# Patient Record
Sex: FEMALE | Race: WHITE | NOT HISPANIC OR LATINO
[De-identification: names, ages, dates, MRNs, and addresses within clinical notes are randomized per-mention and may not be internally consistent; named-entity substitution may affect disease eponyms.]

---

## 2019-04-04 PROBLEM — Z00.00 ENCOUNTER FOR PREVENTIVE HEALTH EXAMINATION: Status: ACTIVE | Noted: 2019-04-04

## 2019-04-29 ENCOUNTER — APPOINTMENT (OUTPATIENT)
Dept: HEART AND VASCULAR | Facility: CLINIC | Age: 69
End: 2019-04-29
Payer: COMMERCIAL

## 2019-04-29 VITALS
RESPIRATION RATE: 14 BRPM | DIASTOLIC BLOOD PRESSURE: 76 MMHG | TEMPERATURE: 98.2 F | BODY MASS INDEX: 22.35 KG/M2 | WEIGHT: 123 LBS | HEIGHT: 62.25 IN | OXYGEN SATURATION: 98 % | SYSTOLIC BLOOD PRESSURE: 108 MMHG | HEART RATE: 73 BPM

## 2019-04-29 DIAGNOSIS — Z87.891 PERSONAL HISTORY OF NICOTINE DEPENDENCE: ICD-10-CM

## 2019-04-29 DIAGNOSIS — Z83.438 FAMILY HISTORY OF OTHER DISORDER OF LIPOPROTEIN METABOLISM AND OTHER LIPIDEMIA: ICD-10-CM

## 2019-04-29 DIAGNOSIS — Z82.49 FAMILY HISTORY OF ISCHEMIC HEART DISEASE AND OTHER DISEASES OF THE CIRCULATORY SYSTEM: ICD-10-CM

## 2019-04-29 DIAGNOSIS — R01.1 CARDIAC MURMUR, UNSPECIFIED: ICD-10-CM

## 2019-04-29 DIAGNOSIS — Z78.9 OTHER SPECIFIED HEALTH STATUS: ICD-10-CM

## 2019-04-29 PROCEDURE — 99244 OFF/OP CNSLTJ NEW/EST MOD 40: CPT

## 2019-04-29 PROCEDURE — 93000 ELECTROCARDIOGRAM COMPLETE: CPT

## 2019-04-29 RX ORDER — MONTELUKAST 10 MG/1
10 TABLET, FILM COATED ORAL
Qty: 30 | Refills: 0 | Status: DISCONTINUED | COMMUNITY
Start: 2018-11-08 | End: 2019-04-29

## 2019-04-29 NOTE — REVIEW OF SYSTEMS
[Shortness Of Breath] : no shortness of breath [Chest Pain] : no chest pain [Palpitations] : palpitations [Negative] : Gastrointestinal

## 2019-04-29 NOTE — PHYSICAL EXAM
[General Appearance - Well Developed] : well developed [Normal Appearance] : normal appearance [Well Groomed] : well groomed [General Appearance - Well Nourished] : well nourished [No Deformities] : no deformities [General Appearance - In No Acute Distress] : no acute distress [Normal Conjunctiva] : the conjunctiva exhibited no abnormalities [] : no respiratory distress [Respiration, Rhythm And Depth] : normal respiratory rhythm and effort [Exaggerated Use Of Accessory Muscles For Inspiration] : no accessory muscle use [Auscultation Breath Sounds / Voice Sounds] : lungs were clear to auscultation bilaterally [Heart Rate And Rhythm] : heart rate and rhythm were normal [Heart Sounds] : normal S1 and S2 [Bowel Sounds] : normal bowel sounds [Abdomen Soft] : soft [Abnormal Walk] : normal gait [FreeTextEntry1] : no edema [Skin Turgor] : normal skin turgor [Oriented To Time, Place, And Person] : oriented to person, place, and time [Affect] : the affect was normal [Mood] : the mood was normal [No Anxiety] : not feeling anxious

## 2019-04-29 NOTE — DISCUSSION/SUMMARY
[FreeTextEntry1] : Palpitations, murmur, MVP--I asked her to return for an Echocardiogram. Obtain stress test results

## 2019-04-29 NOTE — HISTORY OF PRESENT ILLNESS
[FreeTextEntry1] : 68 year female with MVP and extra heart beats. She was seen by Dr Louie who performed an Echo on May 22, 2018. She believes she had Stress Test in Fall 2018

## 2019-04-30 ENCOUNTER — TRANSCRIPTION ENCOUNTER (OUTPATIENT)
Age: 69
End: 2019-04-30

## 2019-04-30 LAB
ALBUMIN SERPL ELPH-MCNC: 4.7 G/DL
ALP BLD-CCNC: 53 U/L
ALT SERPL-CCNC: 25 U/L
ANION GAP SERPL CALC-SCNC: 12 MMOL/L
AST SERPL-CCNC: 26 U/L
BASOPHILS # BLD AUTO: 0.04 K/UL
BASOPHILS NFR BLD AUTO: 0.6 %
BILIRUB SERPL-MCNC: 0.4 MG/DL
BUN SERPL-MCNC: 18 MG/DL
CALCIUM SERPL-MCNC: 9.9 MG/DL
CHLORIDE SERPL-SCNC: 100 MMOL/L
CHOLEST SERPL-MCNC: 234 MG/DL
CHOLEST/HDLC SERPL: 4 RATIO
CO2 SERPL-SCNC: 27 MMOL/L
CREAT SERPL-MCNC: 0.74 MG/DL
EOSINOPHIL # BLD AUTO: 0.17 K/UL
EOSINOPHIL NFR BLD AUTO: 2.6 %
GLUCOSE SERPL-MCNC: 93 MG/DL
HCT VFR BLD CALC: 37.3 %
HDLC SERPL-MCNC: 59 MG/DL
HGB BLD-MCNC: 11.8 G/DL
IMM GRANULOCYTES NFR BLD AUTO: 0.2 %
LDLC SERPL CALC-MCNC: 153 MG/DL
LYMPHOCYTES # BLD AUTO: 2.83 K/UL
LYMPHOCYTES NFR BLD AUTO: 42.5 %
MAGNESIUM SERPL-MCNC: 2.1 MG/DL
MAN DIFF?: NORMAL
MCHC RBC-ENTMCNC: 30.8 PG
MCHC RBC-ENTMCNC: 31.6 GM/DL
MCV RBC AUTO: 97.4 FL
MONOCYTES # BLD AUTO: 0.48 K/UL
MONOCYTES NFR BLD AUTO: 7.2 %
NEUTROPHILS # BLD AUTO: 3.13 K/UL
NEUTROPHILS NFR BLD AUTO: 46.9 %
PLATELET # BLD AUTO: 251 K/UL
POTASSIUM SERPL-SCNC: 4.3 MMOL/L
PROT SERPL-MCNC: 7.6 G/DL
RBC # BLD: 3.83 M/UL
RBC # FLD: 13 %
SODIUM SERPL-SCNC: 139 MMOL/L
TRIGL SERPL-MCNC: 111 MG/DL
TSH SERPL-ACNC: 2.18 UIU/ML
WBC # FLD AUTO: 6.66 K/UL

## 2019-05-06 ENCOUNTER — CLINICAL ADVICE (OUTPATIENT)
Age: 69
End: 2019-05-06

## 2019-05-20 ENCOUNTER — APPOINTMENT (OUTPATIENT)
Dept: HEART AND VASCULAR | Facility: CLINIC | Age: 69
End: 2019-05-20
Payer: COMMERCIAL

## 2019-05-20 DIAGNOSIS — I34.0 NONRHEUMATIC MITRAL (VALVE) INSUFFICIENCY: ICD-10-CM

## 2019-05-20 DIAGNOSIS — Z86.79 PERSONAL HISTORY OF OTHER DISEASES OF THE CIRCULATORY SYSTEM: ICD-10-CM

## 2019-05-20 PROCEDURE — 99214 OFFICE O/P EST MOD 30 MIN: CPT | Mod: 25

## 2019-05-20 PROCEDURE — 93306 TTE W/DOPPLER COMPLETE: CPT

## 2019-05-20 NOTE — PHYSICAL EXAM
[General Appearance - Well Developed] : well developed [Normal Appearance] : normal appearance [Well Groomed] : well groomed [General Appearance - Well Nourished] : well nourished [No Deformities] : no deformities [General Appearance - In No Acute Distress] : no acute distress [Normal Conjunctiva] : the conjunctiva exhibited no abnormalities [Abnormal Walk] : normal gait [Oriented To Time, Place, And Person] : oriented to person, place, and time [Affect] : the affect was normal [Mood] : the mood was normal [No Anxiety] : not feeling anxious

## 2019-05-20 NOTE — DISCUSSION/SUMMARY
[FreeTextEntry1] : At the time of the patient's visit an Echocardiogram was performed to evaluate her LV function. \par \par At the time of the visit the results were reviewed with patient. I asked her to return for a stress test

## 2019-05-20 NOTE — HISTORY OF PRESENT ILLNESS
[FreeTextEntry1] : 68 year female with palpitations. We reviewed her labs. We discussed option of dietary improvement versus statin use

## 2019-05-22 ENCOUNTER — TRANSCRIPTION ENCOUNTER (OUTPATIENT)
Age: 69
End: 2019-05-22

## 2019-06-24 ENCOUNTER — APPOINTMENT (OUTPATIENT)
Dept: HEART AND VASCULAR | Facility: CLINIC | Age: 69
End: 2019-06-24

## 2019-06-26 ENCOUNTER — APPOINTMENT (OUTPATIENT)
Dept: HEART AND VASCULAR | Facility: CLINIC | Age: 69
End: 2019-06-26

## 2020-10-08 ENCOUNTER — OUTPATIENT (OUTPATIENT)
Dept: OUTPATIENT SERVICES | Facility: HOSPITAL | Age: 70
LOS: 1 days | End: 2020-10-08
Payer: MEDICARE

## 2020-10-08 VITALS
DIASTOLIC BLOOD PRESSURE: 80 MMHG | HEIGHT: 65 IN | RESPIRATION RATE: 18 BRPM | OXYGEN SATURATION: 99 % | HEART RATE: 82 BPM | SYSTOLIC BLOOD PRESSURE: 136 MMHG | TEMPERATURE: 99 F | WEIGHT: 119.93 LBS

## 2020-10-08 DIAGNOSIS — Z01.818 ENCOUNTER FOR OTHER PREPROCEDURAL EXAMINATION: ICD-10-CM

## 2020-10-08 DIAGNOSIS — N84.0 POLYP OF CORPUS UTERI: ICD-10-CM

## 2020-10-08 DIAGNOSIS — N95.0 POSTMENOPAUSAL BLEEDING: ICD-10-CM

## 2020-10-08 DIAGNOSIS — Z98.890 OTHER SPECIFIED POSTPROCEDURAL STATES: Chronic | ICD-10-CM

## 2020-10-08 LAB
ALBUMIN SERPL ELPH-MCNC: 3.7 G/DL — SIGNIFICANT CHANGE UP (ref 3.3–5)
ALP SERPL-CCNC: 45 U/L — SIGNIFICANT CHANGE UP (ref 40–120)
ALT FLD-CCNC: 34 U/L — SIGNIFICANT CHANGE UP (ref 12–78)
ANION GAP SERPL CALC-SCNC: 5 MMOL/L — SIGNIFICANT CHANGE UP (ref 5–17)
AST SERPL-CCNC: 25 U/L — SIGNIFICANT CHANGE UP (ref 15–37)
BILIRUB SERPL-MCNC: 0.4 MG/DL — SIGNIFICANT CHANGE UP (ref 0.2–1.2)
BUN SERPL-MCNC: 18 MG/DL — SIGNIFICANT CHANGE UP (ref 7–23)
CALCIUM SERPL-MCNC: 8.7 MG/DL — SIGNIFICANT CHANGE UP (ref 8.5–10.1)
CHLORIDE SERPL-SCNC: 108 MMOL/L — SIGNIFICANT CHANGE UP (ref 96–108)
CO2 SERPL-SCNC: 28 MMOL/L — SIGNIFICANT CHANGE UP (ref 22–31)
CREAT SERPL-MCNC: 0.71 MG/DL — SIGNIFICANT CHANGE UP (ref 0.5–1.3)
GLUCOSE SERPL-MCNC: 93 MG/DL — SIGNIFICANT CHANGE UP (ref 70–99)
HCT VFR BLD CALC: 33.1 % — LOW (ref 34.5–45)
HGB BLD-MCNC: 11.5 G/DL — SIGNIFICANT CHANGE UP (ref 11.5–15.5)
MCHC RBC-ENTMCNC: 31.4 PG — SIGNIFICANT CHANGE UP (ref 27–34)
MCHC RBC-ENTMCNC: 34.7 GM/DL — SIGNIFICANT CHANGE UP (ref 32–36)
MCV RBC AUTO: 90.4 FL — SIGNIFICANT CHANGE UP (ref 80–100)
NRBC # BLD: 0 /100 WBCS — SIGNIFICANT CHANGE UP (ref 0–0)
PLATELET # BLD AUTO: 229 K/UL — SIGNIFICANT CHANGE UP (ref 150–400)
POTASSIUM SERPL-MCNC: 4.1 MMOL/L — SIGNIFICANT CHANGE UP (ref 3.5–5.3)
POTASSIUM SERPL-SCNC: 4.1 MMOL/L — SIGNIFICANT CHANGE UP (ref 3.5–5.3)
PROT SERPL-MCNC: 7.4 G/DL — SIGNIFICANT CHANGE UP (ref 6–8.3)
RBC # BLD: 3.66 M/UL — LOW (ref 3.8–5.2)
RBC # FLD: 12.6 % — SIGNIFICANT CHANGE UP (ref 10.3–14.5)
SODIUM SERPL-SCNC: 141 MMOL/L — SIGNIFICANT CHANGE UP (ref 135–145)
WBC # BLD: 5.91 K/UL — SIGNIFICANT CHANGE UP (ref 3.8–10.5)
WBC # FLD AUTO: 5.91 K/UL — SIGNIFICANT CHANGE UP (ref 3.8–10.5)

## 2020-10-08 PROCEDURE — G0463: CPT

## 2020-10-08 PROCEDURE — 86850 RBC ANTIBODY SCREEN: CPT

## 2020-10-08 PROCEDURE — 93005 ELECTROCARDIOGRAM TRACING: CPT

## 2020-10-08 PROCEDURE — 36415 COLL VENOUS BLD VENIPUNCTURE: CPT

## 2020-10-08 PROCEDURE — 86900 BLOOD TYPING SEROLOGIC ABO: CPT

## 2020-10-08 PROCEDURE — 80053 COMPREHEN METABOLIC PANEL: CPT

## 2020-10-08 PROCEDURE — 93010 ELECTROCARDIOGRAM REPORT: CPT

## 2020-10-08 PROCEDURE — 85027 COMPLETE CBC AUTOMATED: CPT

## 2020-10-08 PROCEDURE — 86901 BLOOD TYPING SEROLOGIC RH(D): CPT

## 2020-10-08 RX ORDER — ROSUVASTATIN CALCIUM 5 MG/1
1 TABLET ORAL
Qty: 0 | Refills: 0 | DISCHARGE

## 2020-10-08 NOTE — H&P PST ADULT - NSICDXPASTMEDICALHX_GEN_ALL_CORE_FT
PAST MEDICAL HISTORY:  HLD (hyperlipidemia)     MVP (mitral valve prolapse)     Pulmonary nodule      PAST MEDICAL HISTORY:  HLD (hyperlipidemia)     MVP (mitral valve prolapse)     Polyp of corpus uteri     Pulmonary nodule

## 2020-10-08 NOTE — H&P PST ADULT - RS GEN PE MLT RESP DETAILS PC
no rales/breath sounds equal/no rhonchi/chest wall tenderness/respirations non-labored/airway patent/good air movement/clear to auscultation bilaterally

## 2020-10-08 NOTE — H&P PST ADULT - ALLERGY TYPES
outdoor environmental allergies/indoor environmental allergies/reactions to animals/dog dander, hay fever

## 2020-10-08 NOTE — H&P PST ADULT - NSICDXPROBLEM_GEN_ALL_CORE_FT
PROBLEM DIAGNOSES  Problem: Polyp of corpus uteri  Assessment and Plan: She is scheduled for Dilation and curettage hysteroscopy with myosure device by Dr. Vidal on 10/22/20.    Problem: Preop testing  Assessment and Plan: *COVID testing will be done at [facility] and scheduled on 10/19/20  * Following labs ordered-CBC, CMP,Type and screen  * Following diagnostics ordered- EKG.  * Going for medical clearance with Dr. Mariscal who is her cardiologist. Patient scheduled self on 10/13/20.       * Stop any herbal remedies, vitamin supplements or any medication that contains Aspirin , Ibuprofen, Advil, Motrin or Aleve at least 7 days before surgery.  * No illegal drugs for 7 days and no alcohol 24 hrs before procedure.  * Preop instructions explained. Verbalized understanding.

## 2020-10-08 NOTE — H&P PST ADULT - ASSESSMENT
This is a 70 year old alert and oriented white  female with past medical history of high cholesterol, MVP,  here today for pre surgical testing.  She is scheduled for Dilation and curettage hysteroscopy with myosure device by Dr. Vidal on 10/22/20.

## 2020-10-08 NOTE — H&P PST ADULT - HISTORY OF PRESENT ILLNESS
This is a 70 year old alert and oriented white  female with past medical history of high cholesterol, MVP,  here today for pre surgical testing.  She is scheduled for Dilation and curettage hysteroscopy with myosure device by Dr. Vidal on 10/22/20.  Reports having spotting x 2 after recent Ob/gyn visit  was seen again by Ob/gyn, had Ultrasound   Denies any   This is a 70 year old alert and oriented white  female with past medical history of high cholesterol, MVP,  here today for pre surgical testing.  She is scheduled for Dilation and curettage hysteroscopy with myosure device by Dr. Vidal on 10/22/20.  Reports having spotting x 2 after recent Ob/gyn visit  was seen again by Ob/gyn, had Ultrasound of Pelvis and noted to have polyp and advised for the above procedure,   Denies any bleeding or abdominal pain.

## 2020-10-18 DIAGNOSIS — Z01.818 ENCOUNTER FOR OTHER PREPROCEDURAL EXAMINATION: ICD-10-CM

## 2020-10-19 ENCOUNTER — APPOINTMENT (OUTPATIENT)
Dept: DISASTER EMERGENCY | Facility: CLINIC | Age: 70
End: 2020-10-19

## 2021-04-13 ENCOUNTER — APPOINTMENT (OUTPATIENT)
Dept: GASTROENTEROLOGY | Facility: CLINIC | Age: 71
End: 2021-04-13

## 2022-06-06 PROBLEM — N84.0 POLYP OF CORPUS UTERI: Chronic | Status: ACTIVE | Noted: 2020-10-08

## 2022-06-06 PROBLEM — I34.1 NONRHEUMATIC MITRAL (VALVE) PROLAPSE: Chronic | Status: ACTIVE | Noted: 2020-10-08

## 2022-06-06 PROBLEM — R91.1 SOLITARY PULMONARY NODULE: Chronic | Status: ACTIVE | Noted: 2020-10-08

## 2022-06-06 PROBLEM — E78.5 HYPERLIPIDEMIA, UNSPECIFIED: Chronic | Status: ACTIVE | Noted: 2020-10-08

## 2022-09-01 ENCOUNTER — APPOINTMENT (OUTPATIENT)
Dept: ORTHOPEDIC SURGERY | Facility: CLINIC | Age: 72
End: 2022-09-01

## 2022-09-01 VITALS — WEIGHT: 112 LBS | HEIGHT: 64 IN | BODY MASS INDEX: 19.12 KG/M2

## 2022-09-01 DIAGNOSIS — C80.1 MALIGNANT (PRIMARY) NEOPLASM, UNSPECIFIED: ICD-10-CM

## 2022-09-01 PROCEDURE — 73564 X-RAY EXAM KNEE 4 OR MORE: CPT | Mod: 50

## 2022-09-01 PROCEDURE — 20610 DRAIN/INJ JOINT/BURSA W/O US: CPT | Mod: RT

## 2022-09-01 PROCEDURE — J3490M: CUSTOM

## 2022-09-01 PROCEDURE — 99214 OFFICE O/P EST MOD 30 MIN: CPT | Mod: 25

## 2022-09-01 NOTE — IMAGING
[Bilateral] : knee bilaterally [All Views] : anteroposterior, lateral, skyline, and anteroposterior standing [There are no fractures, subluxations or dislocations. No significant abnormalities are seen] : There are no fractures, subluxations or dislocations. No significant abnormalities are seen

## 2022-09-01 NOTE — PHYSICAL EXAM
[Bilateral] : knee bilaterally [NL (140)] : flexion 140 degrees [NL (0)] : extension 0 degrees [] : no erythema

## 2022-09-01 NOTE — PROCEDURE
[Effusion] : effusion [de-identified] : Procedure Name: Large Joint Injection / Aspiration: Depomedrol and Marcaine\par Anesthesia: ethyl chloride sprayed topically.. \par Depomedrol: An injection of Depomedrol 80 mg , 1 cc. \par Marcaine: 5 cc. \par Medication was injected in the right knee. Patient has tried OTC's including aspirin, Ibuprofen, Aleve etc or prescription\par NSAIDS, and/or exercises at home and/ or physical therapy without satisfactory response. After verbal consent using sterile preparation and technique. The risks, benefits, and alternatives to cortisone injection were explained in full to the patient. Risks outlined include but are not limited to infection, sepsis, bleeding, scarring, skin discoloration, temporary  increase in pain, syncopal episode, failure to resolve symptoms, allergic reaction, symptom recurrence, and elevation of blood sugar in diabetics. Patient understood the risks. All questions were answered. After discussion of options, patient requested an injection. Oral informed consent was obtained and sterile prep was done of the injection\par site. Sterile technique was utilized for the procedure including the preparation of the solutions used for the injection. Patient tolerated the procedure well. Advised to ice the injection site this evening. Prep with alcohol locally to site. Sterile technique used. Post Procedure Instructions: Patient was advised to call if redness, pain, or fever occur and\par apply ice for 15 min. out of every hour for the next 12-24 hours as tolerated.  [de-identified] : 22cc

## 2022-09-01 NOTE — ASSESSMENT
[FreeTextEntry1] : 51 year F WITH MODERATE B/L KNEE PAIN WITHOUT INJURY. C/O TIGHTNESS AND PAIN TO THE POSTERIOR ASPECT OF THE KNEES. HAD LT KNEE CSI IN JUNE 2022 WITH SOME RELIEF. PAIN WORSENS WITH STAIRS AND WALKING PROLONGED DISTANCES. PAIN IS AFFECTING ADL AND FUNCTIONAL ACTIVITIES. XRAYS REVIEWED AS NORMAL. TREATMENT OPTIONS REVIEWED. RT KNEE ASP/INJ TODAY. PATIENT TOLERATED INJECTION WELL.

## 2022-09-01 NOTE — HISTORY OF PRESENT ILLNESS
[Left Leg] : left leg [Right Leg] : right leg [Gradual] : gradual [8] : 8 [6] : 6 [Localized] : localized [Tightness] : tightness [Intermittent] : intermittent [Household chores] : household chores [Injection therapy] : injection therapy [Standing] : standing [Walking] : walking [Bending forward] : bending forward [Stairs] : stairs [de-identified] : pt presents here today with bilateral knees pain\par tightness and pain behind the knees \par no injury \par pt had a cortisone injection on the left knee about 2 months ago with some relief  [] : no [FreeTextEntry1] : knees [FreeTextEntry5] : no injury [de-identified] : cortisone injections

## 2022-09-08 ENCOUNTER — APPOINTMENT (OUTPATIENT)
Dept: ORTHOPEDIC SURGERY | Facility: CLINIC | Age: 72
End: 2022-09-08

## 2022-09-08 VITALS — WEIGHT: 112 LBS | HEIGHT: 64 IN | BODY MASS INDEX: 19.12 KG/M2

## 2022-09-08 PROCEDURE — 99214 OFFICE O/P EST MOD 30 MIN: CPT

## 2022-09-08 NOTE — ASSESSMENT
[FreeTextEntry1] : 51 year F WITH MODERATE B/L KNEE PAIN WITHOUT INJURY. C/O TIGHTNESS AND PAIN TO THE POSTERIOR ASPECT OF THE KNEES. HAD LT KNEE CSI IN JUNE 2022 WITH SOME RELIEF. PAIN WORSENS WITH STAIRS AND WALKING PROLONGED DISTANCES. PAIN IS AFFECTING ADL AND FUNCTIONAL ACTIVITIES. XRAYS REVIEWED AS NORMAL. TREATMENT OPTIONS REVIEWED. \par HAD RIGHT ON 9/1/22 WITH GOOD RELIEF. \par \par LEFT KNEE TODAY.

## 2022-09-08 NOTE — PROCEDURE
[de-identified] : Procedure Name: Large Joint Injection / Aspiration: Depomedrol and Marcaine\par Anesthesia: ethyl chloride sprayed topically.. \par Depomedrol: An injection of Depomedrol 80 mg , 1 cc. \par Marcaine: 5 cc. \par Medication was injected in the left knee. Patient has tried OTC's including aspirin, Ibuprofen, Aleve etc or prescription\par NSAIDS, and/or exercises at home and/ or physical therapy without satisfactory response. After verbal consent using sterile preparation and technique. The risks, benefits, and alternatives to cortisone injection were explained in full to the patient. Risks outlined include but are not limited to infection, sepsis, bleeding, scarring, skin discoloration, temporary  increase in pain, syncopal episode, failure to resolve symptoms, allergic reaction, symptom recurrence, and elevation of blood sugar in diabetics. Patient understood the risks. All questions were answered. After discussion of options, patient requested an injection. Oral informed consent was obtained and sterile prep was done of the injection\par site. Sterile technique was utilized for the procedure including the preparation of the solutions used for the injection. Patient tolerated the procedure well. Advised to ice the injection site this evening. Prep with alcohol locally to site. Sterile technique used. Post Procedure Instructions: Patient was advised to call if redness, pain, or fever occur and\par apply ice for 15 min. out of every hour for the next 12-24 hours as tolerated.

## 2022-09-13 ENCOUNTER — APPOINTMENT (OUTPATIENT)
Dept: ENDOCRINOLOGY | Facility: CLINIC | Age: 72
End: 2022-09-13

## 2022-11-10 ENCOUNTER — APPOINTMENT (OUTPATIENT)
Dept: ENDOCRINOLOGY | Facility: CLINIC | Age: 72
End: 2022-11-10

## 2022-11-22 ENCOUNTER — APPOINTMENT (OUTPATIENT)
Dept: ENDOCRINOLOGY | Facility: CLINIC | Age: 72
End: 2022-11-22

## 2022-11-22 NOTE — HISTORY OF PRESENT ILLNESS
[FreeTextEntry1] : Ms. SOARES  is a 72 year old  female  who presents for initial endocrine evaluation. She presents with regard to a history of \par \par  Additional medical history includes that of Uterine Carcinoma, hypertension. hyperlipidemia\par \par \par Medications:\par  Lexapro, Furosemide 20 mg, HCTZ 25 mg, Rosuvastatin 5 mg, Spironolactone 25 mg\par

## 2023-01-12 ENCOUNTER — APPOINTMENT (OUTPATIENT)
Dept: ORTHOPEDIC SURGERY | Facility: CLINIC | Age: 73
End: 2023-01-12
Payer: MEDICARE

## 2023-01-12 PROCEDURE — 20610 DRAIN/INJ JOINT/BURSA W/O US: CPT | Mod: LT

## 2023-01-12 PROCEDURE — J3490N: CUSTOM | Mod: NC

## 2023-01-12 PROCEDURE — 99214 OFFICE O/P EST MOD 30 MIN: CPT | Mod: 25

## 2023-01-12 NOTE — PROCEDURE
[de-identified] : Procedure Name: Large Joint Injection / Aspiration: Depomedrol and Marcaine\par Anesthesia: ethyl chloride sprayed topically.. \par Depomedrol: An injection of Depomedrol 80 mg , 1 cc. \par Marcaine: 5 cc. \par Medication was injected in the left knee. Patient has tried OTC's including aspirin, Ibuprofen, Aleve etc or prescription\par NSAIDS, and/or exercises at home and/ or physical therapy without satisfactory response. After verbal consent using sterile preparation and technique. The risks, benefits, and alternatives to cortisone injection were explained in full to the patient. Risks outlined include but are not limited to infection, sepsis, bleeding, scarring, skin discoloration, temporary  increase in pain, syncopal episode, failure to resolve symptoms, allergic reaction, symptom recurrence, and elevation of blood sugar in diabetics. Patient understood the risks. All questions were answered. After discussion of options, patient requested an injection. Oral informed consent was obtained and sterile prep was done of the injection\par site. Sterile technique was utilized for the procedure including the preparation of the solutions used for the injection. Patient tolerated the procedure well. Advised to ice the injection site this evening. Prep with alcohol locally to site. Sterile technique used. Post Procedure Instructions: Patient was advised to call if redness, pain, or fever occur and\par apply ice for 15 min. out of every hour for the next 12-24 hours as tolerated.

## 2023-01-12 NOTE — ASSESSMENT
[FreeTextEntry1] : 51 year F WITH MODERATE B/L KNEE PAIN WITHOUT INJURY. C/O TIGHTNESS AND PAIN TO THE POSTERIOR ASPECT OF THE KNEES. HAD LT KNEE CSI IN JUNE 2022 WITH SOME RELIEF. PAIN WORSENS WITH STAIRS AND WALKING PROLONGED DISTANCES. PAIN IS AFFECTING ADL AND FUNCTIONAL ACTIVITIES. XRAYS REVIEWED AS NORMAL. TREATMENT OPTIONS REVIEWED. \par HAD RIGHT ON 9/1/22 WITH GOOD RELIEF. \par \par LEFT KNEE TODAY. ASP 5CC PRIOR TO INJECTION

## 2023-01-12 NOTE — HISTORY OF PRESENT ILLNESS
[7] : 7 [Constant] : constant [Rest] : rest [Injection therapy] : injection therapy [Walking] : walking [Stairs] : stairs [de-identified] : 01/12/23 HERE FOR A FOLLOW UP ON THE LEFT KNEE ,HAD CSI LAST VISIT IN SEPTEMBER WITH GOOD RELIEF  [FreeTextEntry1] : left leg [de-identified] : LEFT CSI INJECTION

## 2023-04-14 ENCOUNTER — APPOINTMENT (OUTPATIENT)
Dept: ORTHOPEDIC SURGERY | Facility: CLINIC | Age: 73
End: 2023-04-14

## 2023-04-28 ENCOUNTER — LABORATORY RESULT (OUTPATIENT)
Age: 73
End: 2023-04-28

## 2023-04-28 ENCOUNTER — APPOINTMENT (OUTPATIENT)
Dept: ORTHOPEDIC SURGERY | Facility: CLINIC | Age: 73
End: 2023-04-28
Payer: MEDICARE

## 2023-04-28 VITALS — HEIGHT: 63.5 IN | BODY MASS INDEX: 19.78 KG/M2 | WEIGHT: 113 LBS

## 2023-04-28 DIAGNOSIS — M17.11 UNILATERAL PRIMARY OSTEOARTHRITIS, RIGHT KNEE: ICD-10-CM

## 2023-04-28 DIAGNOSIS — M17.12 UNILATERAL PRIMARY OSTEOARTHRITIS, LEFT KNEE: ICD-10-CM

## 2023-04-28 PROCEDURE — 20610 DRAIN/INJ JOINT/BURSA W/O US: CPT | Mod: 50

## 2023-04-28 PROCEDURE — J3490M: CUSTOM | Mod: NC

## 2023-04-28 PROCEDURE — 99214 OFFICE O/P EST MOD 30 MIN: CPT | Mod: 25

## 2023-04-28 NOTE — PHYSICAL EXAM
[Bilateral] : knee bilaterally [NL (140)] : flexion 140 degrees [NL (0)] : extension 0 degrees [] : patient ambulates without assistive device

## 2023-04-28 NOTE — PROCEDURE
[Right] : of the right [Knee] : knee [Effusion] : effusion [de-identified] : Procedure Name: Large Joint Injection / Aspiration: Depomedrol and Marcaine\par Anesthesia: ethyl chloride sprayed topically.. \par Depomedrol: An injection of Depomedrol 40 mg , 1 cc. \par Marcaine: 5 cc. \par Medication was injected in the right knee. Patient has tried OTC's including aspirin, Ibuprofen, Aleve etc or prescription\par NSAIDS, and/or exercises at home and/ or physical therapy without satisfactory response. After verbal consent using sterile preparation and technique. The risks, benefits, and alternatives to cortisone injection were explained in full to the patient. Risks outlined include but are not limited to infection, sepsis, bleeding, scarring, skin discoloration, temporary  increase in pain, syncopal episode, failure to resolve symptoms, allergic reaction, symptom recurrence, and elevation of blood sugar in diabetics. Patient understood the risks. All questions were answered. After discussion of options, patient requested an injection. Oral informed consent was obtained and sterile prep was done of the injection\par site. Sterile technique was utilized for the procedure including the preparation of the solutions used for the injection. Patient tolerated the procedure well. Advised to ice the injection site this evening. Prep with alcohol locally to site. Sterile technique used. Post Procedure Instructions: Patient was advised to call if redness, pain, or fever occur and\par apply ice for 15 min. out of every hour for the next 12-24 hours as tolerated.  [de-identified] : 40cc

## 2023-04-28 NOTE — ASSESSMENT
[FreeTextEntry1] : 72 year F WITH MODERATE B/L KNEE PAIN WITHOUT INJURY. C/O TIGHTNESS AND PAIN TO THE POSTERIOR ASPECT OF THE KNEES. HAD LT KNEE CSI IN JUNE 2022 WITH SOME RELIEF. PAIN WORSENS WITH STAIRS AND WALKING PROLONGED DISTANCES. PAIN IS AFFECTING ADL AND FUNCTIONAL ACTIVITIES. XRAYS REVIEWED AS NORMAL. TREATMENT OPTIONS REVIEWED. HAD LT KNEE CSI ON 1/12/23 WITH GOOD RELIEF. QUESTIONS ANSWERED. \par \par RT KNEE CSI TODAY WITH 40MG DEPO. PATIENT TOLERATED INJECTION WELL. \par 40cc ASPIRATED PRIOR TO INJECTION. \par WILL SEND FOR LABS.

## 2023-05-18 ENCOUNTER — APPOINTMENT (OUTPATIENT)
Dept: ORTHOPEDIC SURGERY | Facility: CLINIC | Age: 73
End: 2023-05-18

## 2023-05-26 ENCOUNTER — APPOINTMENT (OUTPATIENT)
Dept: ORTHOPEDIC SURGERY | Facility: CLINIC | Age: 73
End: 2023-05-26

## 2023-06-12 LAB — JOINT CULTURE: NORMAL

## 2023-08-09 ENCOUNTER — APPOINTMENT (OUTPATIENT)
Dept: FAMILY MEDICINE | Facility: CLINIC | Age: 73
End: 2023-08-09
Payer: MEDICARE

## 2023-08-09 ENCOUNTER — APPOINTMENT (OUTPATIENT)
Dept: FAMILY MEDICINE | Facility: CLINIC | Age: 73
End: 2023-08-09

## 2023-08-09 ENCOUNTER — NON-APPOINTMENT (OUTPATIENT)
Age: 73
End: 2023-08-09

## 2023-08-09 ENCOUNTER — LABORATORY RESULT (OUTPATIENT)
Age: 73
End: 2023-08-09

## 2023-08-09 VITALS
WEIGHT: 114 LBS | HEART RATE: 110 BPM | HEIGHT: 63.5 IN | DIASTOLIC BLOOD PRESSURE: 79 MMHG | OXYGEN SATURATION: 98 % | BODY MASS INDEX: 19.95 KG/M2 | SYSTOLIC BLOOD PRESSURE: 114 MMHG

## 2023-08-09 DIAGNOSIS — I89.0 LYMPHEDEMA, NOT ELSEWHERE CLASSIFIED: ICD-10-CM

## 2023-08-09 DIAGNOSIS — R00.2 PALPITATIONS: ICD-10-CM

## 2023-08-09 PROCEDURE — G0439: CPT

## 2023-08-09 PROCEDURE — G0444 DEPRESSION SCREEN ANNUAL: CPT

## 2023-08-09 RX ORDER — ALBUTEROL SULFATE 2.5 MG/3ML
(2.5 MG/3ML) SOLUTION RESPIRATORY (INHALATION)
Qty: 75 | Refills: 0 | Status: DISCONTINUED | COMMUNITY
Start: 2018-11-03 | End: 2023-08-09

## 2023-08-09 RX ORDER — LATANOPROST/PF 0.005 %
0.01 DROPS OPHTHALMIC (EYE)
Qty: 2 | Refills: 0 | Status: DISCONTINUED | COMMUNITY
Start: 2019-04-08 | End: 2023-08-09

## 2023-08-09 RX ORDER — MELOXICAM 7.5 MG/1
7.5 TABLET ORAL
Qty: 90 | Refills: 3 | Status: ACTIVE | COMMUNITY
Start: 2023-08-09 | End: 1900-01-01

## 2023-08-09 RX ORDER — CALCIUM CARBONATE/VITAMIN D3 600 MG-10
TABLET ORAL
Refills: 0 | Status: ACTIVE | COMMUNITY

## 2023-08-09 RX ORDER — CHROMIUM 200 MCG
TABLET ORAL
Refills: 0 | Status: ACTIVE | COMMUNITY

## 2023-08-09 RX ORDER — ALBUTEROL SULFATE 90 UG/1
108 (90 BASE) AEROSOL, METERED RESPIRATORY (INHALATION)
Qty: 18 | Refills: 0 | Status: DISCONTINUED | COMMUNITY
Start: 2018-12-27 | End: 2023-08-09

## 2023-08-09 NOTE — REVIEW OF SYSTEMS
[Fatigue] : fatigue [Negative] : Respiratory [Fever] : no fever [Chills] : no chills [Hot Flashes] : no hot flashes [Night Sweats] : no night sweats [Recent Change In Weight] : ~T no recent weight change [Discharge] : no discharge

## 2023-08-09 NOTE — HEALTH RISK ASSESSMENT
[Fair] :  ~his/her~ mood as fair [No] : No [1 or 2 (0 pts)] : 1 or 2 (0 points) [Monthly or less (1 pt)] : Monthly or less (1 point) [Never (0 pts)] : Never (0 points) [No falls in past year] : Patient reported no falls in the past year [1] : 2) Feeling down, depressed, or hopeless for several days (1) [PHQ-2 Positive] : PHQ-2 Positive [de-identified] : active around the home [de-identified] : reg [[id number]] : 2

## 2023-08-09 NOTE — HISTORY OF PRESENT ILLNESS
[FreeTextEntry1] : new patient check-up [de-identified] : 2021: Hysterectomy for stage 1 endometrial cancer. 9 rounds of ketruda/ vaginal radiation. has bilateral bakers cysts Dr eladia herring, rheumatology: Poly arthropathy,  iron deficiency anemia, had iron infusions several times. now on po iron one/day. had upper and lower with CALLIE Reyna.  fatigued, not sleeping well. sleeps 11-7, but toss and turns  sister and brother with lung cancer, not smokers (positive secondhand smoke, 911)

## 2023-08-14 ENCOUNTER — TRANSCRIPTION ENCOUNTER (OUTPATIENT)
Age: 73
End: 2023-08-14

## 2023-08-14 LAB
25(OH)D3 SERPL-MCNC: 36.4 NG/ML
ALBUMIN SERPL ELPH-MCNC: 4.1 G/DL
ALP BLD-CCNC: 74 U/L
ALT SERPL-CCNC: 34 U/L
ANION GAP SERPL CALC-SCNC: 13 MMOL/L
APPEARANCE: CLEAR
AST SERPL-CCNC: 29 U/L
BILIRUB SERPL-MCNC: 0.3 MG/DL
BILIRUBIN URINE: NEGATIVE
BLOOD URINE: ABNORMAL
BUN SERPL-MCNC: 17 MG/DL
CALCIUM SERPL-MCNC: 9.7 MG/DL
CHLORIDE SERPL-SCNC: 99 MMOL/L
CHOLEST SERPL-MCNC: 223 MG/DL
CO2 SERPL-SCNC: 27 MMOL/L
COLOR: YELLOW
CREAT SERPL-MCNC: 0.65 MG/DL
CRP SERPL-MCNC: 40 MG/L
EGFR: 92 ML/MIN/1.73M2
ERYTHROCYTE [SEDIMENTATION RATE] IN BLOOD BY WESTERGREN METHOD: 76 MM/HR
ESTIMATED AVERAGE GLUCOSE: 126 MG/DL
FERRITIN SERPL-MCNC: 553 NG/ML
FOLATE SERPL-MCNC: >20 NG/ML
GLUCOSE QUALITATIVE U: NEGATIVE MG/DL
GLUCOSE SERPL-MCNC: 90 MG/DL
HBA1C MFR BLD HPLC: 6 %
HDLC SERPL-MCNC: 57 MG/DL
IRON SATN MFR SERPL: 7 %
IRON SERPL-MCNC: 21 UG/DL
KETONES URINE: NEGATIVE MG/DL
LDLC SERPL CALC-MCNC: 143 MG/DL
LEUKOCYTE ESTERASE URINE: ABNORMAL
MAGNESIUM SERPL-MCNC: 2.1 MG/DL
NITRITE URINE: NEGATIVE
NONHDLC SERPL-MCNC: 166 MG/DL
PH URINE: 5.5
POTASSIUM SERPL-SCNC: 4.7 MMOL/L
PROT SERPL-MCNC: 7.2 G/DL
PROTEIN URINE: NORMAL MG/DL
SODIUM SERPL-SCNC: 138 MMOL/L
SPECIFIC GRAVITY URINE: 1.01
TIBC SERPL-MCNC: 287 UG/DL
TRIGL SERPL-MCNC: 129 MG/DL
TSH SERPL-ACNC: 1.3 UIU/ML
UIBC SERPL-MCNC: 266 UG/DL
UROBILINOGEN URINE: 0.2 MG/DL
VIT B12 SERPL-MCNC: 1097 PG/ML

## 2023-08-17 ENCOUNTER — TRANSCRIPTION ENCOUNTER (OUTPATIENT)
Age: 73
End: 2023-08-17

## 2023-08-18 ENCOUNTER — TRANSCRIPTION ENCOUNTER (OUTPATIENT)
Age: 73
End: 2023-08-18

## 2023-08-21 ENCOUNTER — TRANSCRIPTION ENCOUNTER (OUTPATIENT)
Age: 73
End: 2023-08-21

## 2023-08-22 ENCOUNTER — TRANSCRIPTION ENCOUNTER (OUTPATIENT)
Age: 73
End: 2023-08-22

## 2023-08-23 ENCOUNTER — TRANSCRIPTION ENCOUNTER (OUTPATIENT)
Age: 73
End: 2023-08-23

## 2023-08-24 ENCOUNTER — TRANSCRIPTION ENCOUNTER (OUTPATIENT)
Age: 73
End: 2023-08-24

## 2023-09-27 ENCOUNTER — APPOINTMENT (OUTPATIENT)
Dept: FAMILY MEDICINE | Facility: CLINIC | Age: 73
End: 2023-09-27
Payer: MEDICARE

## 2023-09-27 VITALS
OXYGEN SATURATION: 99 % | DIASTOLIC BLOOD PRESSURE: 75 MMHG | BODY MASS INDEX: 19.48 KG/M2 | HEIGHT: 63.5 IN | HEART RATE: 88 BPM | RESPIRATION RATE: 15 BRPM | WEIGHT: 111.31 LBS | SYSTOLIC BLOOD PRESSURE: 119 MMHG | TEMPERATURE: 97.9 F

## 2023-09-27 DIAGNOSIS — M13.0 POLYARTHRITIS, UNSPECIFIED: ICD-10-CM

## 2023-09-27 DIAGNOSIS — D50.8 OTHER IRON DEFICIENCY ANEMIAS: ICD-10-CM

## 2023-09-27 DIAGNOSIS — F43.10 POST-TRAUMATIC STRESS DISORDER, UNSPECIFIED: ICD-10-CM

## 2023-09-27 DIAGNOSIS — C55 MALIGNANT NEOPLASM OF UTERUS, PART UNSPECIFIED: ICD-10-CM

## 2023-09-27 PROCEDURE — 99214 OFFICE O/P EST MOD 30 MIN: CPT | Mod: 25

## 2023-09-27 RX ORDER — FAMOTIDINE 20 MG/1
20 TABLET, FILM COATED ORAL
Refills: 0 | Status: ACTIVE | COMMUNITY

## 2023-11-02 ENCOUNTER — RX RENEWAL (OUTPATIENT)
Age: 73
End: 2023-11-02

## 2023-11-02 RX ORDER — DULOXETINE HYDROCHLORIDE 30 MG/1
30 CAPSULE, DELAYED RELEASE PELLETS ORAL
Qty: 90 | Refills: 0 | Status: ACTIVE | COMMUNITY
Start: 2023-08-09 | End: 1900-01-01

## 2024-08-02 ENCOUNTER — APPOINTMENT (OUTPATIENT)
Dept: ORTHOPEDIC SURGERY | Facility: CLINIC | Age: 74
End: 2024-08-02
Payer: MEDICARE

## 2024-08-02 VITALS — WEIGHT: 115 LBS | HEIGHT: 64.5 IN | BODY MASS INDEX: 19.39 KG/M2

## 2024-08-02 DIAGNOSIS — M75.02 ADHESIVE CAPSULITIS OF LEFT SHOULDER: ICD-10-CM

## 2024-08-02 DIAGNOSIS — M75.112 INCOMPLETE ROTATOR CUFF TEAR OR RUPTURE OF LEFT SHOULDER, NOT SPECIFIED AS TRAUMATIC: ICD-10-CM

## 2024-08-02 PROCEDURE — 99214 OFFICE O/P EST MOD 30 MIN: CPT

## 2024-08-02 NOTE — PHYSICAL EXAM
[Left] : left shoulder [5 ___] : forward flexion 5[unfilled]/5 [5___] : external rotation 5[unfilled]/5 [] : negative Murphy [FreeTextEntry9] : FE: R 150, L 140 ER: R 50, L 40

## 2024-08-02 NOTE — HISTORY OF PRESENT ILLNESS
[Dull/Aching] : dull/aching [Localized] : localized [Sharp] : sharp [Nothing helps with pain getting better] : Nothing helps with pain getting better [Exercising] : exercising [Retired] : Work status: retired [2] : 2 [7] : 7 [Sleep] : sleep [de-identified] : 8/2/24: 73 y/o LHD female here today for the L shoulder.  She reports multiple joint issues related to her treatment for endometrial cancer.  She sees a rheumatologist who treated her with MTX and now hydroxychloroquine.   She had PT in the shoulder earlier this year.  She reports pain more anterior.  No radiation.  She tried Tylenol.  No injections.    PMHx: HLD  MRI L shoulder (stand up): 1. Supraspinatus tendinosis with superimposed intrasubstance partial-thickness, partial width tear. Infraspinatus tendinosis with superimposed intrasubstance partial-thickness, partial width tear.  Subscapularis tendinosis with superimposed intrasubstance partial-thickness, partial width tear. 2. Tearing of the superior, anterosuperior, and posterosuperior labrum. 3. Biceps tenosynovitis. 4. Subacromial/subdeltoid bursitis. 5. Small glenohumeral joint effusion. 6. Mild acromioclavicular joint arthrosis. [] : Post Surgical Visit: no [FreeTextEntry1] : Left shoulder  [FreeTextEntry3] : 1/2024 [FreeTextEntry5] : No known injury,  [de-identified] : movement

## 2024-08-02 NOTE — DATA REVIEWED
[MRI] : MRI [Left] : left [Shoulder] : shoulder [I independently reviewed and interpreted images and report] : I independently reviewed and interpreted images and report [FreeTextEntry1] : mild PRCT, inf capsular thickening

## 2024-08-02 NOTE — ASSESSMENT
[FreeTextEntry1] : MRI L shoulder: mild PRCT, inf capsular thickening Left shoulder adhesive capsulitis. We discussed that symptoms often take 9-12 months to resolve. HEP. PT for gentle stretching only.  She takes prednisone.  Consider L GH injection. RTO 6-8 weeks.

## 2024-09-27 ENCOUNTER — APPOINTMENT (OUTPATIENT)
Dept: ORTHOPEDIC SURGERY | Facility: CLINIC | Age: 74
End: 2024-09-27